# Patient Record
Sex: FEMALE | URBAN - METROPOLITAN AREA
[De-identification: names, ages, dates, MRNs, and addresses within clinical notes are randomized per-mention and may not be internally consistent; named-entity substitution may affect disease eponyms.]

---

## 2020-05-04 ENCOUNTER — OFFICE VISIT (OUTPATIENT)
Dept: URBAN - METROPOLITAN AREA CLINIC 16 | Facility: CLINIC | Age: 75
End: 2020-05-04
Payer: MEDICARE

## 2020-05-04 PROCEDURE — 92012 INTRM OPH EXAM EST PATIENT: CPT | Performed by: OPTOMETRIST

## 2020-05-04 PROCEDURE — 92002 INTRM OPH EXAM NEW PATIENT: CPT | Performed by: OPTOMETRIST

## 2020-05-04 ASSESSMENT — KERATOMETRY
OS: 46.13
OD: 45.75

## 2020-05-04 ASSESSMENT — INTRAOCULAR PRESSURE
OD: 16
OS: 16

## 2020-05-04 NOTE — IMPRESSION/PLAN
Impression: Anatomical narrow angle, bilateral: H40.033. Plan: Discussed diagnosis in detail with patient. Instructed patient to avoid meds that may dilate pupil.

## 2020-05-04 NOTE — IMPRESSION/PLAN
Impression: Combined forms of age-related cataract, bilateral: H25.813.  Plan: Recommend cataract evaluation after treatment

## 2020-05-12 ENCOUNTER — OFFICE VISIT (OUTPATIENT)
Dept: URBAN - METROPOLITAN AREA CLINIC 29 | Facility: CLINIC | Age: 75
End: 2020-05-12
Payer: MEDICARE

## 2020-05-12 DIAGNOSIS — H40.033 ANATOMICAL NARROW ANGLE, BILATERAL: Primary | ICD-10-CM

## 2020-05-12 PROCEDURE — 92133 CPTRZD OPH DX IMG PST SGM ON: CPT | Performed by: OPHTHALMOLOGY

## 2020-05-12 PROCEDURE — 92014 COMPRE OPH EXAM EST PT 1/>: CPT | Performed by: OPHTHALMOLOGY

## 2020-05-12 PROCEDURE — 92020 GONIOSCOPY: CPT | Performed by: OPHTHALMOLOGY

## 2020-05-12 RX ORDER — PREDNISOLONE ACETATE 10 MG/ML
1 % SUSPENSION/ DROPS OPHTHALMIC
Qty: 1 | Refills: 1 | Status: INACTIVE
Start: 2020-05-12 | End: 2020-05-12

## 2020-05-12 ASSESSMENT — INTRAOCULAR PRESSURE
OS: 21
OD: 21

## 2020-05-12 NOTE — IMPRESSION/PLAN
Impression: Anatomical narrow angle, bilateral: H40.033. Plan: Pt has NAG  Risk  Gonio :VLADIMIR     Pachs:574/561   Today's IOP  :21/21    Tmax &date:21/21 Target IOP low to mid teens Pt denies Family hx of Glaucoma Right eye is the better seeing eye (Last vf &date )
C/D: 0.3 round OU
OCT: 72/72 5/12/20 Pt denies Sulfa Allergy // Pt denies Lung /Heart dx Pt is currently using : No Drops Plan :
1. Recommend LPI OU ; The patient was warned of signs and symptoms of angle closure glaucoma and the need for immediate follow-up. Discussed risks/benefits of laser peripheral iridotomy treatment. There is a possibility of need for additional sessions to complete LPI Discussed LPI does not lower pressure nor does it improve vision. If patient is on eye pressure reducing medication now, patient will still need to use them after LPI. A Ghost image or line in field of vision may be more evident in the bright light conditions. This usually resolves overtime. Also discussed possible need for further intervention with Pilocarpine, Iridoplasty, and/or Phaco +/- ECP if angles do not open after LPI. Patient to avoid medication with glaucoma warnings. All questions answered. Will rx Pred Q2h Day of procedure then taper, QID for 7 days then discontinue. 2. Pt agrees with plan and wishes to move forward 3. Pt was given written literature on NAG 4. Schedule LPI Risk Level 1

## 2020-05-19 ENCOUNTER — SURGERY (OUTPATIENT)
Dept: URBAN - METROPOLITAN AREA SURGERY 11 | Facility: SURGERY | Age: 75
End: 2020-05-19
Payer: MEDICARE

## 2020-05-19 PROCEDURE — 66761 REVISION OF IRIS: CPT | Performed by: OPHTHALMOLOGY

## 2020-06-10 ENCOUNTER — SURGERY (OUTPATIENT)
Dept: URBAN - METROPOLITAN AREA SURGERY 11 | Facility: SURGERY | Age: 75
End: 2020-06-10
Payer: MEDICARE

## 2020-06-10 PROCEDURE — 66761 REVISION OF IRIS: CPT | Performed by: OPHTHALMOLOGY

## 2020-06-23 ENCOUNTER — OFFICE VISIT (OUTPATIENT)
Dept: URBAN - METROPOLITAN AREA CLINIC 23 | Facility: CLINIC | Age: 75
End: 2020-06-23
Payer: MEDICARE

## 2020-06-23 PROCEDURE — 92020 GONIOSCOPY: CPT | Performed by: OPHTHALMOLOGY

## 2020-06-23 PROCEDURE — 92012 INTRM OPH EXAM EST PATIENT: CPT | Performed by: OPHTHALMOLOGY

## 2020-06-23 ASSESSMENT — INTRAOCULAR PRESSURE
OD: 22
OS: 22

## 2020-06-23 NOTE — IMPRESSION/PLAN
Impression: Anatomical narrow angle, bilateral: H40.033. Plan: Pt has NAG  Risk  Gonio : ss 2-3+ pg ou    Pachs:574/561   Today's IOP  :21/21    Tmax &date:21/21 Target IOP low to mid teens Pt denies Family hx of Glaucoma Right eye is the better seeing eye No VF today - Will warrant in the future C/D: 0.3 round OU
OCT: 72/72 5/12/20 Pt denies Sulfa Allergy // Pt denies Lung /Heart dx Pt is currently using : No Drops Plan :
 S/p LPI OD // S/p LPI OS 1. Pt is doing well s/p LPI ou ; LPI patent ou 2. Pt does not warrant pharmacologic treatment at this time ; will continue to monitor 3. Pt is safe for dilation as of today 06/23/2020 4.  Schedule Cataract evaluation 6 months with dilation and VISUAL FIELD 24-2

## 2020-06-23 NOTE — IMPRESSION/PLAN
Impression: Combined forms of age-related cataract, bilateral: H25.813.  Plan: 6 month CAT Eval, DFE and VF

## 2021-02-25 ENCOUNTER — TESTING ONLY (OUTPATIENT)
Dept: URBAN - METROPOLITAN AREA CLINIC 23 | Facility: CLINIC | Age: 76
End: 2021-02-25
Payer: MEDICARE

## 2021-02-25 PROCEDURE — 92083 EXTENDED VISUAL FIELD XM: CPT | Performed by: OPHTHALMOLOGY

## 2021-03-09 ENCOUNTER — OFFICE VISIT (OUTPATIENT)
Dept: URBAN - METROPOLITAN AREA CLINIC 29 | Facility: CLINIC | Age: 76
End: 2021-03-09
Payer: MEDICARE

## 2021-03-09 DIAGNOSIS — H25.813 COMBINED FORMS OF AGE-RELATED CATARACT, BILATERAL: Primary | ICD-10-CM

## 2021-03-09 PROCEDURE — 99214 OFFICE O/P EST MOD 30 MIN: CPT | Performed by: OPHTHALMOLOGY

## 2021-03-09 RX ORDER — PREDNISOLONE ACETATE 10 MG/ML
1 % SUSPENSION/ DROPS OPHTHALMIC
Qty: 10 | Refills: 1 | Status: INACTIVE
Start: 2021-03-09 | End: 2021-05-04

## 2021-03-09 RX ORDER — OFLOXACIN 3 MG/ML
0.3 % SOLUTION/ DROPS OPHTHALMIC
Qty: 5 | Refills: 1 | Status: INACTIVE
Start: 2021-03-09 | End: 2021-07-08

## 2021-03-09 ASSESSMENT — KERATOMETRY
OS: 45.75
OD: 45.63

## 2021-03-09 ASSESSMENT — VISUAL ACUITY
OD: 20/20
OS: 20/40

## 2021-03-09 ASSESSMENT — INTRAOCULAR PRESSURE
OD: 22
OS: 22

## 2021-03-09 NOTE — IMPRESSION/PLAN
Impression: Combined forms of age-related cataract, bilateral: H25.813. Plan: Discussed cataract diagnosis with the patient. Discussed risks, benefits and alternatives to surgery including but not limited to: bleeding, infection, risk of vision loss, loss of the eye, need for other surgery. Patient voiced understanding and wishes to proceed. Patient elects surgical treatment. Specialty lens options discussed and pt declines. Patient desires surgery OU (( AIM  PL OU,)) Patient understands the need for glasses after surgery for BCVA. MIGS Discussed with pt limitations of MIGS device and it does not replace  Glaucoma eye drops and would have to continue treatment and would still need glasses after surgery. Understands possible use of iris stretch. Risk Level: 1 Right eye first Standard IOL Left eye second Standard IOL 
(+) NO PREOP WARRANED
(+) ASCAN WARRANTED 
(+) BLOCK 
(-) NO KETOROLAC 2/2 ALLERGY
(+) DISNTACE VISION OU

10 Minutes Drops Sent

## 2021-03-22 ENCOUNTER — PRE-OPERATIVE VISIT (OUTPATIENT)
Dept: URBAN - METROPOLITAN AREA CLINIC 29 | Facility: CLINIC | Age: 76
End: 2021-03-22
Payer: MEDICARE

## 2021-03-22 PROCEDURE — 76519 ECHO EXAM OF EYE: CPT | Performed by: OPHTHALMOLOGY

## 2021-03-22 ASSESSMENT — PACHYMETRY
OD: 22.70
OS: 2.55
OS: 22.96
OD: 2.47

## 2021-04-14 ENCOUNTER — SURGERY (OUTPATIENT)
Dept: URBAN - METROPOLITAN AREA SURGERY 11 | Facility: SURGERY | Age: 76
End: 2021-04-14
Payer: MEDICARE

## 2021-04-14 PROCEDURE — 66984 XCAPSL CTRC RMVL W/O ECP: CPT | Performed by: OPHTHALMOLOGY

## 2021-04-15 ENCOUNTER — POST-OPERATIVE VISIT (OUTPATIENT)
Dept: URBAN - METROPOLITAN AREA CLINIC 29 | Facility: CLINIC | Age: 76
End: 2021-04-15
Payer: MEDICARE

## 2021-04-15 DIAGNOSIS — Z48.810 ENCOUNTER FOR SURGICAL AFTERCARE FOLLOWING SURGERY ON A SENSE ORGAN: Primary | ICD-10-CM

## 2021-04-15 PROCEDURE — 99024 POSTOP FOLLOW-UP VISIT: CPT | Performed by: OPTOMETRIST

## 2021-04-15 ASSESSMENT — INTRAOCULAR PRESSURE
OD: 20
OS: 20

## 2021-04-15 NOTE — IMPRESSION/PLAN
Impression: S/P Cataract Extraction by phacoemulsification with IOL placement OD - 1 Day. Encounter for surgical aftercare following surgery on a sense organ  Z48.810.  Excellent post op course   Post operative instructions reviewed - Plan:

## 2021-04-22 ENCOUNTER — POST-OPERATIVE VISIT (OUTPATIENT)
Dept: URBAN - METROPOLITAN AREA CLINIC 29 | Facility: CLINIC | Age: 76
End: 2021-04-22
Payer: MEDICARE

## 2021-04-22 PROCEDURE — 99024 POSTOP FOLLOW-UP VISIT: CPT | Performed by: OPTOMETRIST

## 2021-04-22 ASSESSMENT — VISUAL ACUITY
OD: 20/20-1
OS: 20/30

## 2021-04-22 ASSESSMENT — INTRAOCULAR PRESSURE
OS: 19
OD: 19

## 2021-04-22 NOTE — IMPRESSION/PLAN
Impression: S/P Cataract Extraction by phacoemulsification with IOL placement OD - 8 Days. Encounter for surgical aftercare following surgery on a sense organ  Z48.430.  Post operative instructions reviewed - Plan: --Continue Prednisolone acetate 1%--Discontinue Ofloxacin 0.3%

## 2021-05-04 ENCOUNTER — OFFICE VISIT (OUTPATIENT)
Dept: URBAN - METROPOLITAN AREA CLINIC 29 | Facility: CLINIC | Age: 76
End: 2021-05-04
Payer: MEDICARE

## 2021-05-04 PROCEDURE — 99024 POSTOP FOLLOW-UP VISIT: CPT | Performed by: OPHTHALMOLOGY

## 2021-05-04 RX ORDER — PREDNISOLONE ACETATE 10 MG/ML
1 % SUSPENSION/ DROPS OPHTHALMIC
Qty: 10 | Refills: 1 | Status: INACTIVE
Start: 2021-05-04 | End: 2021-07-08

## 2021-05-04 ASSESSMENT — INTRAOCULAR PRESSURE
OS: 17
OD: 16

## 2021-05-04 NOTE — IMPRESSION/PLAN
Impression: S/P Cataract Extraction by phacoemulsification with IOL placement OD - 3 weeks. Encounter for surgical aftercare following surgery on a sense organ  Z48.810. Post operative instructions reviewed - Plan: Patient is doing well. Patient is happy with results however there is edema in the cornea. Will restart Pred Taper 4,3,2,1 every week. 
Patient is cleared for 2nd eye

## 2021-07-07 ENCOUNTER — SURGERY (OUTPATIENT)
Dept: URBAN - METROPOLITAN AREA SURGERY 11 | Facility: SURGERY | Age: 76
End: 2021-07-07
Payer: MEDICARE

## 2021-07-07 PROCEDURE — 66984 XCAPSL CTRC RMVL W/O ECP: CPT | Performed by: OPHTHALMOLOGY

## 2021-07-08 ENCOUNTER — POST-OPERATIVE VISIT (OUTPATIENT)
Dept: URBAN - METROPOLITAN AREA CLINIC 29 | Facility: CLINIC | Age: 76
End: 2021-07-08
Payer: MEDICARE

## 2021-07-08 DIAGNOSIS — Z96.1 PRESENCE OF INTRAOCULAR LENS: Primary | ICD-10-CM

## 2021-07-08 PROCEDURE — 99024 POSTOP FOLLOW-UP VISIT: CPT | Performed by: OPTOMETRIST

## 2021-07-08 RX ORDER — OFLOXACIN 3 MG/ML
0.3 % SOLUTION/ DROPS OPHTHALMIC
Qty: 5 | Refills: 1 | Status: INACTIVE
Start: 2021-07-08 | End: 2021-07-15

## 2021-07-08 RX ORDER — PREDNISOLONE ACETATE 10 MG/ML
1 % SUSPENSION/ DROPS OPHTHALMIC
Qty: 10 | Refills: 1 | Status: INACTIVE
Start: 2021-07-08 | End: 2021-08-05

## 2021-07-08 ASSESSMENT — INTRAOCULAR PRESSURE
OS: 19
OD: 15

## 2021-07-08 NOTE — IMPRESSION/PLAN
Impression: S/P CE/IOL standard SA60WF +20.50 OS - 1 Day. Presence of intraocular lens  Z96.1. Post operative instructions reviewed - Plan: Continue Ofloxacin 0.3% Continue Prednisolone acetate

## 2021-07-14 ENCOUNTER — POST-OPERATIVE VISIT (OUTPATIENT)
Dept: URBAN - METROPOLITAN AREA CLINIC 29 | Facility: CLINIC | Age: 76
End: 2021-07-14
Payer: MEDICARE

## 2021-07-14 PROCEDURE — 99024 POSTOP FOLLOW-UP VISIT: CPT | Performed by: OPTOMETRIST

## 2021-07-14 ASSESSMENT — INTRAOCULAR PRESSURE
OS: 17
OD: 15

## 2021-07-14 NOTE — IMPRESSION/PLAN
Impression: S/P CE/IOL standard SA60WF +20.50 OS - 7 Days. Presence of intraocular lens  Z96.1. Opacified Capsule OU. Plan: --Advised patient to use artificial tears for comfort. --Discontinue Ofloxacin 0.3%--Taper Prednisolone acetate 1% TID x 1 wk, BID x 1wk, QD x 1wk, then d/c

## 2021-08-18 ENCOUNTER — OFFICE VISIT (OUTPATIENT)
Dept: URBAN - METROPOLITAN AREA CLINIC 29 | Facility: CLINIC | Age: 76
End: 2021-08-18
Payer: COMMERCIAL

## 2021-08-18 DIAGNOSIS — H52.223 REGULAR ASTIGMATISM, BILATERAL: Primary | ICD-10-CM

## 2021-08-18 PROCEDURE — 92012 INTRM OPH EXAM EST PATIENT: CPT | Performed by: OPTOMETRIST

## 2021-08-18 ASSESSMENT — KERATOMETRY
OS: 46.13
OD: 44.75

## 2021-08-18 ASSESSMENT — INTRAOCULAR PRESSURE
OS: 16
OD: 16

## 2021-08-18 ASSESSMENT — VISUAL ACUITY
OD: 20/20
OS: 20/20

## 2022-08-19 ENCOUNTER — OFFICE VISIT (OUTPATIENT)
Dept: URBAN - METROPOLITAN AREA CLINIC 28 | Facility: CLINIC | Age: 77
End: 2022-08-19
Payer: COMMERCIAL

## 2022-08-19 DIAGNOSIS — H52.223 REGULAR ASTIGMATISM, BILATERAL: Primary | ICD-10-CM

## 2022-08-19 PROCEDURE — 92012 INTRM OPH EXAM EST PATIENT: CPT | Performed by: OPTOMETRIST

## 2022-08-19 ASSESSMENT — VISUAL ACUITY
OS: 20/20
OD: 20/20

## 2022-08-19 ASSESSMENT — INTRAOCULAR PRESSURE
OD: 17
OS: 16